# Patient Record
Sex: FEMALE | Race: WHITE | NOT HISPANIC OR LATINO | Employment: STUDENT | URBAN - NONMETROPOLITAN AREA
[De-identification: names, ages, dates, MRNs, and addresses within clinical notes are randomized per-mention and may not be internally consistent; named-entity substitution may affect disease eponyms.]

---
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
## 2024-05-16 ENCOUNTER — HOSPITAL ENCOUNTER (EMERGENCY)
Facility: HOSPITAL | Age: 19
Discharge: HOME | End: 2024-05-16
Payer: COMMERCIAL

## 2024-05-16 VITALS
OXYGEN SATURATION: 96 % | HEIGHT: 67 IN | TEMPERATURE: 98 F | RESPIRATION RATE: 16 BRPM | BODY MASS INDEX: 38.3 KG/M2 | DIASTOLIC BLOOD PRESSURE: 80 MMHG | WEIGHT: 244 LBS | HEART RATE: 80 BPM | SYSTOLIC BLOOD PRESSURE: 125 MMHG

## 2024-05-16 DIAGNOSIS — S69.91XA INJURY OF RIGHT HAND, INITIAL ENCOUNTER: Primary | ICD-10-CM

## 2024-05-16 PROCEDURE — 99283 EMERGENCY DEPT VISIT LOW MDM: CPT | Mod: 25

## 2024-05-16 PROCEDURE — 99282 EMERGENCY DEPT VISIT SF MDM: CPT | Mod: ,,, | Performed by: NURSE PRACTITIONER

## 2024-05-16 RX ORDER — NAPROXEN SODIUM 220 MG/1
81 TABLET, FILM COATED ORAL DAILY
COMMUNITY

## 2024-05-16 RX ORDER — NAPROXEN SODIUM 220 MG
220 TABLET ORAL
COMMUNITY

## 2024-05-17 ENCOUNTER — TELEPHONE (OUTPATIENT)
Dept: EMERGENCY MEDICINE | Facility: HOSPITAL | Age: 19
End: 2024-05-17
Payer: COMMERCIAL

## 2024-05-17 NOTE — DISCHARGE INSTRUCTIONS
-Motrin 600 mg every 6 hours as needed for pain. Take with food  -Ice pack on 10-15 min, then off 20 minutes

## 2024-05-17 NOTE — ED PROVIDER NOTES
Encounter Date: 5/16/2024       History     Chief Complaint   Patient presents with    Wrist Injury     Right wrist pain since 7:45 pm after her hand collided with the head of her dog while out walking/running with dog.     19 y/o WF arrived to the ED via POV with complaint of right hand pain and swelling that started about 7:45 pm after accident with her dog.. She was running with her dog when her hand collided with its head. Rates pain 4/10. Pain worse with use of hand. Has not taken anything for pain. Right hand dominant.    The history is provided by the patient.     Review of patient's allergies indicates:  No Known Allergies  Past Medical History:   Diagnosis Date    Allergy     Anxiety     Asthma     Depression      Past Surgical History:   Procedure Laterality Date    TONSILLECTOMY Bilateral 8/4/2023    Procedure: TONSILLECTOMY;  Surgeon: John Chavez MD;  Location: [facility];  Service: ENT;  Laterality: Bilateral;    WISDOM TOOTH EXTRACTION       Family History   Problem Relation Name Age of Onset    Heart disease Mother      Depression Mother      Anxiety disorder Mother      Asthma Mother      Asthma Maternal Grandmother      COPD Maternal Grandmother      Schizophrenia Maternal Grandmother       Social History     Tobacco Use    Smoking status: Never    Smokeless tobacco: Never   Substance Use Topics    Alcohol use: Never    Drug use: Never     Review of Systems   Musculoskeletal:  Positive for arthralgias (right hand). Negative for joint swelling.   Skin:  Positive for color change (bruising to hand). Negative for rash.   Neurological:  Negative for numbness.   All other systems reviewed and are negative.      Physical Exam     Initial Vitals [05/16/24 2215]   BP Pulse Resp Temp SpO2   125/80 80 16 97.9 °F (36.6 °C) 96 %      MAP       --         Physical Exam    Nursing note and vitals reviewed.  Constitutional: She appears well-developed and well-nourished.   Cardiovascular:  Normal  rate, regular rhythm, S1 normal, normal heart sounds and normal pulses.           No murmur heard.  Pulses:       Radial pulses are 2+ on the right side and 2+ on the left side.   Pulmonary/Chest: Effort normal and breath sounds normal.   Musculoskeletal:      Right hand: Swelling, tenderness and bony tenderness present. Decreased range of motion. Normal sensation. Normal capillary refill. Normal pulse.      Left hand: Normal.     Neurological: She is alert and oriented to person, place, and time. She has normal strength. No sensory deficit.   Skin: Skin is warm, dry and intact. Capillary refill takes less than 2 seconds.   Psychiatric: She has a normal mood and affect. Her speech is normal and behavior is normal.         Medical Screening Exam   See Full Note    ED Course   Procedures  Labs Reviewed - No data to display       Imaging Results              X-Ray Hand 3 view Right (In process)                      Medications - No data to display  Medical Decision Making  17 y/o WF arrived to the ED via POV with complaint of right hand pain and swelling that started about 7:45 pm after accident with her dog.. She was running with her dog when her hand collided with its head. Rates pain 4/10. Pain worse with use of hand. Has not taken anything for pain. Right hand dominant.    Problems Addressed:  Injury of right hand, initial encounter:     Details: -Motrin 600 mg every 6 hours as needed for pain. Take with food  -Ice pack on 10-15 min, then off 20 minutes  -Schedule follow up with PCP if symptoms do not improve    Amount and/or Complexity of Data Reviewed  Radiology: ordered.     Details: Right hand x-ray: no acute process noted, formal report pending.   Discussion of management or test interpretation with external provider(s): Discharge MDM  I discussed x-ray with patient. No acute fracture or dislocation noted. Will notify her pending formal x-ray report.   Offered Toradol injection, but patient declined. Reviewed  discharge instructions with patient. She agreed to treatment plan and verbalized understanding.  Patient was discharged in stable condition.  Detailed return precautions discussed.                                       Clinical Impression:   Final diagnoses:  [S69.91XA] Injury of right hand, initial encounter (Primary)        ED Disposition Condition    Discharge Stable          ED Prescriptions    None       Follow-up Information       Follow up With Specialties Details Why Contact Info    Primary Care Provider  Call in 1 day to schedule follow up visit              Becca Stone, DENA  05/16/24 7045       Becca Stone, DENA  05/16/24 [id number]

## 2024-12-18 ENCOUNTER — CLINICAL SUPPORT (OUTPATIENT)
Dept: PRIMARY CARE CLINIC | Facility: CLINIC | Age: 19
End: 2024-12-18

## 2024-12-18 DIAGNOSIS — Z02.83 ENCOUNTER FOR DRUG SCREENING: ICD-10-CM

## 2024-12-18 DIAGNOSIS — Z01.10 ENCOUNTER FOR HEARING EXAMINATION, UNSPECIFIED WHETHER ABNORMAL FINDINGS: Primary | ICD-10-CM

## 2024-12-18 PROCEDURE — 92552 PURE TONE AUDIOMETRY AIR: CPT | Mod: ,,, | Performed by: NURSE PRACTITIONER

## 2024-12-18 PROCEDURE — 99000 SPECIMEN HANDLING OFFICE-LAB: CPT | Mod: ,,, | Performed by: NURSE PRACTITIONER

## 2024-12-18 NOTE — PROGRESS NOTES
Patient ID: Karoline Escalona is a 19 y.o. female.    Chief Complaint: No chief complaint on file.    History of Present Illness              Physical Exam              Assessment & Plan               1. Encounter for hearing examination, unspecified whether abnormal findings    2. Encounter for drug screening        No follow-ups on file.    This note was generated with the assistance of ambient listening technology. Verbal consent was obtained by the patient and accompanying visitor(s) for the recording of patient appointment to facilitate this note. I attest to having reviewed and edited the generated note for accuracy, though some syntax or spelling errors may persist. Please contact the author of this note for any clarification.

## (undated) DEVICE — SOL NACL IRR 1000ML BTL

## (undated) DEVICE — PACK ECLIPSE BASIC III SURG

## (undated) DEVICE — CLEANER CAUT TIP STRL 2X2IN

## (undated) DEVICE — SYR IRRIGATION BULB STER 60ML

## (undated) DEVICE — PENCIL ELECTROSURG HOLST W/BLD

## (undated) DEVICE — GLOVE BIOGEL SKINSENSE PI 7.5

## (undated) DEVICE — ELECTRODE NDL EDGE 2 5/6IN

## (undated) DEVICE — TUBE SUCTION MEDI-VAC STERILE

## (undated) DEVICE — TOWEL OR DISP STRL BLUE 4/PK

## (undated) DEVICE — APPLICATOR STRL COT 2INNR 6IN